# Patient Record
Sex: FEMALE | Race: OTHER | HISPANIC OR LATINO
[De-identification: names, ages, dates, MRNs, and addresses within clinical notes are randomized per-mention and may not be internally consistent; named-entity substitution may affect disease eponyms.]

---

## 2021-11-23 PROBLEM — Z00.00 ENCOUNTER FOR PREVENTIVE HEALTH EXAMINATION: Status: ACTIVE | Noted: 2021-11-23

## 2021-12-02 ENCOUNTER — TRANSCRIPTION ENCOUNTER (OUTPATIENT)
Age: 41
End: 2021-12-02

## 2022-01-19 ENCOUNTER — APPOINTMENT (OUTPATIENT)
Dept: RHEUMATOLOGY | Facility: CLINIC | Age: 42
End: 2022-01-19
Payer: COMMERCIAL

## 2022-01-19 VITALS
WEIGHT: 190 LBS | DIASTOLIC BLOOD PRESSURE: 62 MMHG | BODY MASS INDEX: 38.3 KG/M2 | OXYGEN SATURATION: 99 % | TEMPERATURE: 98 F | HEIGHT: 59 IN | HEART RATE: 67 BPM | SYSTOLIC BLOOD PRESSURE: 118 MMHG

## 2022-01-19 DIAGNOSIS — Z78.9 OTHER SPECIFIED HEALTH STATUS: ICD-10-CM

## 2022-01-19 PROCEDURE — 99205 OFFICE O/P NEW HI 60 MIN: CPT

## 2022-01-19 NOTE — HISTORY OF PRESENT ILLNESS
[FreeTextEntry1] : Patient reports that about three months ago she developed hand pain and significant AM stiffness in the hands. There has been increased fatigue but no rash, skin photosensitivity, fever, hair loss or Raynaud's, but has had pleuritic C./P in the last few months. There is no known FH of autoimmune  CTD.

## 2022-01-19 NOTE — PHYSICAL EXAM
[General Appearance - Alert] : alert [General Appearance - In No Acute Distress] : in no acute distress [General Appearance - Well Nourished] : well nourished [General Appearance - Well Developed] : well developed [Sclera] : the sclera and conjunctiva were normal [Auscultation Breath Sounds / Voice Sounds] : lungs were clear to auscultation bilaterally [Cervical Lymph Nodes Enlarged Posterior Bilaterally] : posterior cervical [Right] : on the right [Size ___ cm] : measuring [unfilled]Ucm [Tender] : tender [Mobile] : mobile [Rubbery] : rubbery [] : no rash [Motor Exam] : the motor exam was normal [Fixed] : non-fixed [FreeTextEntry1] : There is bilateral GH tenderness without palpable swelling.  There is joint line tenderness of the elbows, and mild pain o extreme ROM of the wrists. There is no MCP or PIP tenderness. There is ankle bogginess with joint line tenderness and a hint of warmth in the ankles bilaterally, with mild tenderness of scattered MTP joint on the right only.

## 2022-01-19 NOTE — REASON FOR VISIT
[Initial Evaluation] : an initial evaluation [FreeTextEntry1] : Joint pain and stiffness in the hands

## 2022-01-20 LAB
ALBUMIN SERPL ELPH-MCNC: 4.2 G/DL
ALP BLD-CCNC: 82 U/L
ALT SERPL-CCNC: 40 U/L
ANION GAP SERPL CALC-SCNC: 11 MMOL/L
AST SERPL-CCNC: 23 U/L
BASOPHILS # BLD AUTO: 0.01 K/UL
BASOPHILS NFR BLD AUTO: 0.3 %
BILIRUB SERPL-MCNC: 0.2 MG/DL
BUN SERPL-MCNC: 19 MG/DL
CALCIUM SERPL-MCNC: 8.5 MG/DL
CHLORIDE SERPL-SCNC: 107 MMOL/L
CO2 SERPL-SCNC: 24 MMOL/L
CREAT SERPL-MCNC: 0.65 MG/DL
CRP SERPL-MCNC: 9 MG/L
EOSINOPHIL # BLD AUTO: 0.13 K/UL
EOSINOPHIL NFR BLD AUTO: 3.4 %
ERYTHROCYTE [SEDIMENTATION RATE] IN BLOOD BY WESTERGREN METHOD: 18 MM/HR
GLUCOSE SERPL-MCNC: 86 MG/DL
HCT VFR BLD CALC: 38.5 %
HGB BLD-MCNC: 12.4 G/DL
IMM GRANULOCYTES NFR BLD AUTO: 0.3 %
LYMPHOCYTES # BLD AUTO: 1.56 K/UL
LYMPHOCYTES NFR BLD AUTO: 41.4 %
MAN DIFF?: NORMAL
MCHC RBC-ENTMCNC: 30.5 PG
MCHC RBC-ENTMCNC: 32.2 GM/DL
MCV RBC AUTO: 94.6 FL
MONOCYTES # BLD AUTO: 0.37 K/UL
MONOCYTES NFR BLD AUTO: 9.8 %
NEUTROPHILS # BLD AUTO: 1.69 K/UL
NEUTROPHILS NFR BLD AUTO: 44.8 %
PLATELET # BLD AUTO: 172 K/UL
POTASSIUM SERPL-SCNC: 3.9 MMOL/L
PROT SERPL-MCNC: 6.5 G/DL
RBC # BLD: 4.07 M/UL
RBC # FLD: 12.3 %
RHEUMATOID FACT SER QL: <10 IU/ML
SODIUM SERPL-SCNC: 142 MMOL/L
WBC # FLD AUTO: 3.77 K/UL

## 2022-01-21 LAB
ANA SER IF-ACNC: NEGATIVE
ENA RNP AB SER IA-ACNC: 0.4 AL
ENA SM AB SER IA-ACNC: <0.2 AL
ENA SS-A AB SER IA-ACNC: <0.2 AL
ENA SS-B AB SER IA-ACNC: <0.2 AL

## 2022-01-24 LAB
CCP AB SER IA-ACNC: <8 UNITS
DSDNA AB SER-ACNC: <12 IU/ML
RF+CCP IGG SER-IMP: NEGATIVE

## 2022-01-26 ENCOUNTER — APPOINTMENT (OUTPATIENT)
Dept: RHEUMATOLOGY | Facility: CLINIC | Age: 42
End: 2022-01-26
Payer: COMMERCIAL

## 2022-01-26 VITALS
SYSTOLIC BLOOD PRESSURE: 110 MMHG | OXYGEN SATURATION: 98 % | BODY MASS INDEX: 38.3 KG/M2 | HEIGHT: 59 IN | DIASTOLIC BLOOD PRESSURE: 60 MMHG | WEIGHT: 190 LBS | HEART RATE: 65 BPM

## 2022-01-26 PROCEDURE — 99214 OFFICE O/P EST MOD 30 MIN: CPT

## 2022-01-26 NOTE — PHYSICAL EXAM
[General Appearance - Alert] : alert [General Appearance - In No Acute Distress] : in no acute distress [General Appearance - Well Nourished] : well nourished [General Appearance - Well Developed] : well developed [Sclera] : the sclera and conjunctiva were normal [] : no rash [Motor Exam] : the motor exam was normal [FreeTextEntry1] : There is minimal GH tenderness on the right without palpable swelling. There is no elbow joint line tenderness, and there is minimal pain on extreme ROM of the left wrist. There is minimal joint line tenderness of the right ankle. No other evidence of active synovitis was found.

## 2022-01-26 NOTE — HISTORY OF PRESENT ILLNESS
[FreeTextEntry1] : Patient reports about 30% improvement in general joint pain and stiffness while on prednisone 10 mg daily. She also reports much less numbness and tingling of the fingers.

## 2022-02-02 ENCOUNTER — APPOINTMENT (OUTPATIENT)
Dept: SURGERY | Facility: CLINIC | Age: 42
End: 2022-02-02
Payer: COMMERCIAL

## 2022-02-02 VITALS — HEIGHT: 61 IN | WEIGHT: 190 LBS | BODY MASS INDEX: 35.87 KG/M2

## 2022-02-02 DIAGNOSIS — Z78.9 OTHER SPECIFIED HEALTH STATUS: ICD-10-CM

## 2022-02-02 PROCEDURE — 99203 OFFICE O/P NEW LOW 30 MIN: CPT

## 2022-02-02 RX ORDER — PREDNISONE 5 MG/1
5 TABLET ORAL DAILY
Qty: 60 | Refills: 1 | Status: COMPLETED | COMMUNITY
Start: 2022-01-19 | End: 2022-02-02

## 2022-02-02 RX ORDER — HYDROXYCHLOROQUINE SULFATE 200 MG/1
200 TABLET, FILM COATED ORAL TWICE DAILY
Qty: 60 | Refills: 2 | Status: COMPLETED | COMMUNITY
Start: 2022-01-26 | End: 2022-02-02

## 2022-02-07 NOTE — PLAN
[FreeTextEntry1] : This is a 42-year-old female on prednisone for an inflammatory polyarthropathy being managed by Dr. Brendan Corbett. She has had a  section in the past and complains of two hernias in both of her groins. She states that they are hurting her and have become larger. She is here to be evaluated for repair.\par \par Review of systems: General-denies fatigue.  Cardiac- denies chest pain.  Respiratory- denies shortness of breath.  GI-denies nausea or vomiting.  -denies dysuria.  Musculoskeletal-denies back pain.  Psychiatric-denies hearing voices.\par \par Physical exam: Head-normocephalic.  Sclera are anicteric.  Neck-supple.  Chest-clear.  Abdomen-soft, nontender, nondistended. She appears to have two reducible inguinal hernias which are appreciated in the supine and standing position. Her  scar is very faint in both these hernias appear to be at the lateral border of the scar. I do not suspect that these are incisional hernias but that they are true inguinal hernias. Extremities-no edema.\par \par Plan: The patient either has bilateral inguinal hernia repairs or incisional hernias or a combination of both. I believe these are inguinal hernias and they would best be repaired by an open approach one at a time. The laparoscopic approach is not viable as she has had a previous  section and the bladder flap mobilization during that procedure makes this approach precarious. If these are inguinal hernia repairs then an open approach could potentially have the option of a primary repair without mesh which is often all that is needed in a female. The patient states that the left side is more symptomatic and therefore would plan to repair the left side first. The right side would be planned for several months later. If during surgery it is discovered that this is actually an incisional hernia than the operation will proceed with repair of that hernia utilizing mesh and possibly component separation. If it involves the right side the now to be repaired at the same time. If this is simply an inguinal hernia then only that will be repaired at this time. The patient is agreeable to this plan. He understands the risk and benefits of surgery including but not limited to the possibility of injury to viscera or bladder, the possibility of infection, the possibility of mesh infection, the possibility of mesh migration, the possibility of chronic pain, possibility of bleeding, possibly of recurrence, plus in addition there are the usual medical risk associated with anesthesia including but not limited to cardiac, neurologic, pulmonary, and vascular complications including death.  Patient understands these risks and is agreeable to surgery. She will need to be optimized by her medical the above documentation completed by the scribe is an accurate record of both my words and actions. Prior to surgery. She likely will require stress dose steroid. And we need to ensure that her blood sugars are in control.\par \par Addendum: The patient has called the office several days later and now states the right side is more symptomatic than the left side.  Therefore we will plan to fix the right side first.

## 2022-03-01 ENCOUNTER — RESULT REVIEW (OUTPATIENT)
Age: 42
End: 2022-03-01

## 2022-03-03 ENCOUNTER — RESULT REVIEW (OUTPATIENT)
Age: 42
End: 2022-03-03

## 2022-03-04 ENCOUNTER — APPOINTMENT (OUTPATIENT)
Dept: SURGERY | Facility: HOSPITAL | Age: 42
End: 2022-03-04

## 2022-03-04 ENCOUNTER — RESULT REVIEW (OUTPATIENT)
Age: 42
End: 2022-03-04

## 2022-03-15 ENCOUNTER — APPOINTMENT (OUTPATIENT)
Dept: SURGERY | Facility: CLINIC | Age: 42
End: 2022-03-15
Payer: COMMERCIAL

## 2022-03-15 PROCEDURE — 99024 POSTOP FOLLOW-UP VISIT: CPT

## 2022-03-15 NOTE — ASSESSMENT
[FreeTextEntry1] : Patient presents today for follow-up after open right inguinal herniorrhaphy.  She has no complaints of pain patient states she had minimal discomfort after surgery.  Her appetite is good she is moving her bowels as normal.\par \par Physical examination incisions well-healed there is no evidence of infection hematoma seroma or ecchymosis she has a nice healing ridge.  No recurrence\par \par Status post open right inguinal herniorrhaphy patient doing well surgically no acute findings.  Patient return on a as needed basis regarding this hernia repair.\par \par Incidentally patient mention she had a left inguinal hernia as well.  I have asked that the patient follow-up with Dr. Moser in approximately 3 months time to be evaluated for left inguinal hernia.  Should she develop problems with the hernia she will come sooner.

## 2022-03-23 ENCOUNTER — APPOINTMENT (OUTPATIENT)
Dept: SURGERY | Facility: CLINIC | Age: 42
End: 2022-03-23
Payer: COMMERCIAL

## 2022-03-23 VITALS
TEMPERATURE: 98.5 F | HEIGHT: 61 IN | BODY MASS INDEX: 35.87 KG/M2 | WEIGHT: 190 LBS | SYSTOLIC BLOOD PRESSURE: 103 MMHG | DIASTOLIC BLOOD PRESSURE: 61 MMHG | HEART RATE: 53 BPM | OXYGEN SATURATION: 98 %

## 2022-03-23 PROCEDURE — 99024 POSTOP FOLLOW-UP VISIT: CPT

## 2022-03-23 NOTE — PLAN
[FreeTextEntry1] : The patient is almost 3 weeks status post open repair of right inguinal hernia with a mesh patch.  She has complaints of some soreness in her vulva away from the incision.  She is eating and going to the bathroom.\par \par On exam her incision is well-healed.  There is no sign of infection.  There is expected induration.  There is no recurrence.  She still has a reducible left inguinal hernia.\par \par Plan: The patient will avoid heavy lifting for 3 additional weeks.  She will follow with me in approximately 3 months time to be evaluated for repair of her left inguinal hernia.  I do not wish to do it at the same time as it increases the risk of infection of the right mesh as well as discomfort with both sides being operated at the same time.  After 3 months the initial mesh should be well incorporated and the infection risk should be minimal.

## 2022-04-26 ENCOUNTER — APPOINTMENT (OUTPATIENT)
Dept: RHEUMATOLOGY | Facility: CLINIC | Age: 42
End: 2022-04-26
Payer: COMMERCIAL

## 2022-04-26 DIAGNOSIS — M06.4 INFLAMMATORY POLYARTHROPATHY: ICD-10-CM

## 2022-04-26 PROCEDURE — 99213 OFFICE O/P EST LOW 20 MIN: CPT

## 2022-04-26 RX ORDER — HYDROXYCHLOROQUINE SULFATE 200 MG/1
200 TABLET, FILM COATED ORAL
Qty: 60 | Refills: 5 | Status: ACTIVE | COMMUNITY
Start: 2022-04-26

## 2022-04-26 NOTE — HISTORY OF PRESENT ILLNESS
[FreeTextEntry1] : Patient was taking prednisone and Plaquenil as Rx'ed until someone told her that both will cause her to gain weight, and she stopped about one month ago (she did not call here). She reports after stopping both, joint symptoms recurred less so than at initial presentation, and is still having joint symptoms.

## 2022-06-14 ENCOUNTER — APPOINTMENT (OUTPATIENT)
Dept: SURGERY | Facility: CLINIC | Age: 42
End: 2022-06-14

## 2022-06-15 ENCOUNTER — APPOINTMENT (OUTPATIENT)
Dept: SURGERY | Facility: CLINIC | Age: 42
End: 2022-06-15
Payer: COMMERCIAL

## 2022-06-15 VITALS
SYSTOLIC BLOOD PRESSURE: 115 MMHG | BODY MASS INDEX: 34.05 KG/M2 | OXYGEN SATURATION: 99 % | HEART RATE: 60 BPM | DIASTOLIC BLOOD PRESSURE: 71 MMHG | WEIGHT: 180.2 LBS | TEMPERATURE: 98.5 F

## 2022-06-15 DIAGNOSIS — Z87.19 PERSONAL HISTORY OF OTHER DISEASES OF THE DIGESTIVE SYSTEM: ICD-10-CM

## 2022-06-15 PROCEDURE — 99213 OFFICE O/P EST LOW 20 MIN: CPT

## 2022-06-15 NOTE — PLAN
[FreeTextEntry1] : The patient is 3 months status post open right inguinal hernia repair with mesh.  Her surgery was done open because she had history of  section.  She was also known to have a left inguinal hernia at the time and decision was made to postpone that surgery since that was the least symptomatic of the 2.  She is here to be evaluated for repair of her left hernia.  She complains of occasional pain in the groin and feeling a lump.  She has no nausea or vomiting.\par \par She takes hydroxychloroquine for inflammatory arthritis\par \par Review of systems: General-denies fatigue.  Cardiac- denies chest pain.  Respiratory- denies shortness of breath.  GI-denies nausea or vomiting.  -denies dysuria.  Musculoskeletal-denies back pain.  Psychiatric-denies hearing voices.\par \par Physical exam: Head-normocephalic.  Sclera are anicteric.  Neck-supple.  Chest-clear.  Abdomen-soft, nontender, nondistended.  Extremities-no edema.  Her right inguinal hernia incision is well-healed.  There is no recurrence.  She has a small reducible left inguinal hernia that is best palpated on standing position.\par \par Plan: We will proceed with open repair of left inguinal hernia possibly with mesh.  The risk and benefits of surgery have been explained to the patient in detail.  These are including but not limited to the possibility of infection, bleeding, injury to underlying viscera, chronic pain, mesh infection, mesh migration, the need for future surgery to remove mesh, alteration to the cosmetic appearance of the abdominal wall, and scar formation.\par \par In addition there are the usual medical risk associated with anesthesia including but not limited to cardiac, neurologic, pulmonary, and vascular complications including death.  Patient understands these risks and is agreeable to surgery.  I will obtain the written consent at [facility] as there are no [place] forms available in the office.  She will get 2 g of Ancef prophylaxis chlorhexadine prep.

## 2022-07-22 ENCOUNTER — RESULT REVIEW (OUTPATIENT)
Age: 42
End: 2022-07-22

## 2022-07-24 ENCOUNTER — RESULT REVIEW (OUTPATIENT)
Age: 42
End: 2022-07-24

## 2022-07-25 ENCOUNTER — APPOINTMENT (OUTPATIENT)
Dept: SURGERY | Facility: HOSPITAL | Age: 42
End: 2022-07-25

## 2022-07-25 ENCOUNTER — RESULT REVIEW (OUTPATIENT)
Age: 42
End: 2022-07-25

## 2022-07-25 ENCOUNTER — TRANSCRIPTION ENCOUNTER (OUTPATIENT)
Age: 42
End: 2022-07-25

## 2022-08-08 ENCOUNTER — APPOINTMENT (OUTPATIENT)
Dept: SURGERY | Facility: CLINIC | Age: 42
End: 2022-08-08

## 2022-08-08 ENCOUNTER — NON-APPOINTMENT (OUTPATIENT)
Age: 42
End: 2022-08-08

## 2022-08-08 VITALS
BODY MASS INDEX: 34.38 KG/M2 | HEIGHT: 60.24 IN | SYSTOLIC BLOOD PRESSURE: 107 MMHG | DIASTOLIC BLOOD PRESSURE: 62 MMHG | WEIGHT: 177.4 LBS | HEART RATE: 59 BPM | RESPIRATION RATE: 18 BRPM | OXYGEN SATURATION: 98 %

## 2022-08-08 PROCEDURE — 99024 POSTOP FOLLOW-UP VISIT: CPT

## 2022-08-08 NOTE — PLAN
[FreeTextEntry1] : The patient is 2-week status post open repair of left inguinal hernia with mesh.  She feels well.  She is complaining about her old right inguinal hernia repair stating that she has pain there.  Both sides are examined.  There is no infection.  There is no recurrence.  Both sides were repaired with mesh.\par \par Plan: Postoperatively she is doing well.  As far as her original hernia repair I suspect this is a muscle pull.  I have advised her to take Motrin.  To apply ice.  And to rested.  If it does not feel better in 6 weeks time she will follow back with me.  She can return to work as long she does not do any heavy lifting.

## 2022-10-31 ENCOUNTER — APPOINTMENT (OUTPATIENT)
Dept: RHEUMATOLOGY | Facility: CLINIC | Age: 42
End: 2022-10-31

## 2022-11-02 ENCOUNTER — APPOINTMENT (OUTPATIENT)
Dept: SURGERY | Facility: CLINIC | Age: 42
End: 2022-11-02

## 2022-11-02 VITALS
SYSTOLIC BLOOD PRESSURE: 97 MMHG | DIASTOLIC BLOOD PRESSURE: 55 MMHG | HEART RATE: 57 BPM | OXYGEN SATURATION: 99 % | TEMPERATURE: 98.3 F | WEIGHT: 182.2 LBS | BODY MASS INDEX: 35.3 KG/M2

## 2022-11-02 DIAGNOSIS — Z87.19 PERSONAL HISTORY OF OTHER DISEASES OF THE DIGESTIVE SYSTEM: ICD-10-CM

## 2022-11-02 PROCEDURE — 99213 OFFICE O/P EST LOW 20 MIN: CPT

## 2022-11-02 NOTE — PLAN
[FreeTextEntry1] : Patient has a history of rheumatoid disease and underwent a right open inguinal hernia repair with mesh in early  and then had a left inguinal hernia repair with mesh in 2022.  At her last office visit she was complaining of pain on the right hernia repair site but not the left side.  She returns today saying that the pain on the right is worse and now she has pain on the left as well.  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  There is no hernias appreciated.  She has a significant pannus which pulls on both scars.  She had a previous  section in this area as well.  I am unable to appreciate a recurrence of hernia on either side.  There is no evidence of infection.\par \par Plan: The patient has persistent pain at both of her hernia sites.  Clinically there is no recurrence or infection.  Most likely this is either a groin pull or chronic inflammation from her pannus and gravity versus less likely her rheumatoid disease.  I will get a CAT scan of her pelvis with IV contrast to better evaluate the area.  If there is no surgical pathology seen I will refer her to pain management

## 2022-11-25 ENCOUNTER — RESULT REVIEW (OUTPATIENT)
Age: 42
End: 2022-11-25

## 2022-12-08 ENCOUNTER — NON-APPOINTMENT (OUTPATIENT)
Age: 42
End: 2022-12-08

## 2022-12-28 ENCOUNTER — APPOINTMENT (OUTPATIENT)
Dept: PAIN MANAGEMENT | Facility: CLINIC | Age: 42
End: 2022-12-28

## 2022-12-28 VITALS
SYSTOLIC BLOOD PRESSURE: 92 MMHG | HEIGHT: 60.24 IN | WEIGHT: 182 LBS | BODY MASS INDEX: 35.26 KG/M2 | DIASTOLIC BLOOD PRESSURE: 53 MMHG | TEMPERATURE: 98 F

## 2022-12-28 PROCEDURE — 99204 OFFICE O/P NEW MOD 45 MIN: CPT

## 2022-12-28 NOTE — REASON FOR VISIT
[Initial Consultation] : an initial pain management consultation [FreeTextEntry2] : Referred by Dr. Moser (surgeon)

## 2022-12-28 NOTE — ASSESSMENT
[FreeTextEntry1] : 42 yof w/ severe bilateral groin pain after bilateral inguinal hernia surgery. Quality of life is impaired. There has been a severe exacerbation of the patient's chronic pain.\par \par I have personally reviewed the patient's CT in detail and discussed it with them which is significant for no recurrent hernia.\par \par The patient has failed to have relief with medication management. The patient has failed to have relief with more then six weeks of physical therapy within the last three months. Given the patients failure to improve with all other conservative measures, recommend bilateral ilioinguinal nerve block under ultrasound guidance. The patient will follow-up with me in my office two weeks following intervention.\par \par Physical therapy prescribed - goal will be to increase ROM, strengthening, postural training, other modalities ad fina which may include massage and stim. Goals of therapy discussed with the patient in detail and will be discussed with physical therapist. Patient will follow-up following course of physical therapy to monitor progress and adjust therapy as needed.\par \par Acetaminophen 1,000 mg q8h prn for moderate pain. Risks, benefits, and alternatives of acetaminophen discussed with patient.\par \par Ibuprofen 600 mg q8h prn add when pain is not adequately controlled with acetaminophen. Risks, benefits, and alternatives of ibuprofen discussed with patient.\par \par Diet and nutritional strategies discussed which may improve patients pain and will improve overall health.\par \par

## 2022-12-28 NOTE — PHYSICAL EXAM

## 2022-12-28 NOTE — DATA REVIEWED
[FreeTextEntry1] : \par  CT Report             Final\par \par No Documents Attached\par \par \par \par \par   [facility]\par                                          [address]\par                                    [place], [place]  [address]\par                                        Department of Radiology\par                                             [phone]\par \par \par Patient Name:      CB ZHENG                Location:       [facility]\par Med Rec #:        [id number]                    Account #:      [id number]\par YOB: 1980                    Ordering:       Eugenio Moser DO\par Age: 42               Sex:    F                 Attending:      Eugenio Moser DO\par PCP:        Eugenio Moser DO\par ______________________________________________________________________________________\par \par Exam Date:      11/25/22\par Exam:         CT PELVIS IC\par Order#:       [id number]\par \par \par \par CLINICAL INFORMATION: Bilateral groin pain status post bilateral hernia repair.\par \par  COMPARISON: CT of the abdomen and pelvis dated 07/03/2018\par \par  CONTRAST/COMPLICATIONS:\par  IV Contrast: Omnipaque 350  90 cc administered   10 cc discarded\par  Oral Contrast: NONE\par  Complications: None reported at time of study completion\par \par  PROCEDURE:\par  CT of the Pelvis was performed.\par  Sagittal and coronal reformats were performed.\par \par  FINDINGS:\par  BLADDER: Within normal limits.\par  REPRODUCTIVE ORGANS: Within normal limits. Engorged left gonadal vein and periuterine vasculature.\par Findings can be seen in normal asymptomatic individuals but also in the setting of pelvic congestion\par syndrome.\par  LYMPH NODES: No pelvic lymphadenopathy.\par \par  VISUALIZED PORTIONS:\par  ABDOMINAL ORGANS: Within normal limits.\par  BOWEL: Within normal limits.\par  PERITONEUM: Small amount of free pelvic fluid.\par  VESSELS: Engorged left gonadal vein and periuterine vasculature. Correlate for clinical evidence of\par pelvic congestion syndrome. Findings are however nonspecific and can be seen in normal asymptomatic\par individuals.\par  ABDOMINAL WALL: Postsurgical changes related to previous bilateral inguinal hernia repair. No\par recurrent hernia or discrete collection. Several stable small caliber varices in the mons pubis.\par  BONES: Within normal limits.\par \par \par  IMPRESSION:\par  Postsurgical changes without recurrent hernia or discrete collection.\par \par \par

## 2022-12-28 NOTE — HISTORY OF PRESENT ILLNESS
[___ mths] : [unfilled] month(s) ago [Constant] : constant [5] : a minimum pain level of 5/10 [6] : a maximum pain level of 6/10 [Sharp] : sharp [Aching] : aching [Laying] : laying [Sitting] : sitting [Walking] : walking [Rest] : rest [FreeTextEntry1] : HPI: Ms. CB ZHENG is a 42 year F with inflammatory arthritis, bilateral inguinal hernia repair (right April '22, left July '22). Reports persistent pain that runs from groin anteriorly to knee since surgery. Quality of life is impaired. There has been a severe exacerbation of the patient's chronic pain.\par \par \par   [FreeTextEntry2] : 18 [FreeTextEntry7] : Groin pain

## 2022-12-28 NOTE — CONSULT LETTER
[Dear  ___] : Dear  [unfilled], [Consult Letter:] : I had the pleasure of evaluating your patient, [unfilled]. [Please see my note below.] : Please see my note below. [Consult Closing:] : Thank you very much for allowing me to participate in the care of this patient.  If you have any questions, please do not hesitate to contact me. [Sincerely,] : Sincerely, [FreeTextEntry3] : Romain Jade MD\par

## 2023-01-12 ENCOUNTER — TRANSCRIPTION ENCOUNTER (OUTPATIENT)
Age: 43
End: 2023-01-12

## 2023-01-12 ENCOUNTER — APPOINTMENT (OUTPATIENT)
Dept: PAIN MANAGEMENT | Facility: HOSPITAL | Age: 43
End: 2023-01-12

## 2023-02-09 ENCOUNTER — APPOINTMENT (OUTPATIENT)
Dept: PAIN MANAGEMENT | Facility: CLINIC | Age: 43
End: 2023-02-09
Payer: COMMERCIAL

## 2023-02-09 VITALS
HEIGHT: 60 IN | BODY MASS INDEX: 35.73 KG/M2 | WEIGHT: 182 LBS | TEMPERATURE: 98 F | DIASTOLIC BLOOD PRESSURE: 72 MMHG | SYSTOLIC BLOOD PRESSURE: 107 MMHG

## 2023-02-09 DIAGNOSIS — R10.31 RIGHT LOWER QUADRANT PAIN: ICD-10-CM

## 2023-02-09 DIAGNOSIS — R10.32 RIGHT LOWER QUADRANT PAIN: ICD-10-CM

## 2023-02-09 DIAGNOSIS — G57.93 UNSPECIFIED MONONEUROPATHY OF BILATERAL LOWER LIMBS: ICD-10-CM

## 2023-02-09 PROCEDURE — 99214 OFFICE O/P EST MOD 30 MIN: CPT

## 2023-02-09 NOTE — HISTORY OF PRESENT ILLNESS
[___ mths] : [unfilled] month(s) ago [Constant] : constant [5] : a minimum pain level of 5/10 [6] : a maximum pain level of 6/10 [Sharp] : sharp [Aching] : aching [Laying] : laying [Sitting] : sitting [Walking] : walking [Rest] : rest [FreeTextEntry1] : Interval History:\par She returns s/p ilioinguinal nerve block which gave her tremendous relief on her left side. She still has some pain on the right. ALso with low back pain. Quality of life is impaired. There has been a severe exacerbation of the patient's chronic pain. Wishes for further intervention. \par \par \par HPI: Ms. CB ZHENG is a 42 year F with inflammatory arthritis, bilateral inguinal hernia repair (right April '22, left July '22). Reports persistent pain that runs from groin anteriorly to knee since surgery. Quality of life is impaired. There has been a severe exacerbation of the patient's chronic pain.\par \par \par   [FreeTextEntry2] : 18 [FreeTextEntry7] : Groin pain

## 2023-02-09 NOTE — DATA REVIEWED
[FreeTextEntry1] : 11/25/22\par Exam:         CT PELVIS IC\par \par  CT of the Pelvis was performed.\par  Sagittal and coronal reformats were performed.\par \par  FINDINGS:\par  BLADDER: Within normal limits.\par  REPRODUCTIVE ORGANS: Within normal limits. Engorged left gonadal vein and periuterine vasculature.\par Findings can be seen in normal asymptomatic individuals but also in the setting of pelvic congestion\par syndrome.\par  LYMPH NODES: No pelvic lymphadenopathy.\par \par  VISUALIZED PORTIONS:\par  ABDOMINAL ORGANS: Within normal limits.\par  BOWEL: Within normal limits.\par  PERITONEUM: Small amount of free pelvic fluid.\par  VESSELS: Engorged left gonadal vein and periuterine vasculature. Correlate for clinical evidence of\par pelvic congestion syndrome. Findings are however nonspecific and can be seen in normal asymptomatic\par individuals.\par  ABDOMINAL WALL: Postsurgical changes related to previous bilateral inguinal hernia repair. No\par recurrent hernia or discrete collection. Several stable small caliber varices in the mons pubis.\par  BONES: Within normal limits.\par \par \par  IMPRESSION:\par  Postsurgical changes without recurrent hernia or discrete collection.\par \par \par

## 2023-02-09 NOTE — PHYSICAL EXAM

## 2023-02-09 NOTE — ASSESSMENT
[FreeTextEntry1] : 43 yof w/ severe bilateral groin pain after bilateral inguinal hernia surgery. Quality of life is impaired. There has been a severe exacerbation of the patient's chronic pain. She had significant relief from left ilioinguinal nerve block.\par \par I have personally reviewed the patient's CT in detail and discussed it with them which is significant for no recurrent hernia.\par \par The patient has failed to have relief with medication management. The patient has failed to have relief with more then six weeks of physical therapy within the last three months. Given the patients failure to improve with all other conservative measures, recommend right ilioinguinal nerve block under ultrasound guidance. The patient will follow-up with me in my office two weeks following intervention.\par \par Consider MRI lumbar spine. \par \par Physical therapy prescribed - goal will be to increase ROM, strengthening, postural training, other modalities ad fina which may include massage and stim. Goals of therapy discussed with the patient in detail and will be discussed with physical therapist. Patient will follow-up following course of physical therapy to monitor progress and adjust therapy as needed.\par \par Acetaminophen 1,000 mg q8h prn for moderate pain. Risks, benefits, and alternatives of acetaminophen discussed with patient.\par \par Ibuprofen 600 mg q8h prn add when pain is not adequately controlled with acetaminophen. Risks, benefits, and alternatives of ibuprofen discussed with patient.\par \par Diet and nutritional strategies discussed which may improve patients pain and will improve overall health.\par \par

## 2023-02-09 NOTE — REASON FOR VISIT
[Follow-Up Visit] : a follow-up pain management visit [FreeTextEntry2] : Referred by Dr. Moser (surgeon)

## 2023-02-21 ENCOUNTER — TRANSCRIPTION ENCOUNTER (OUTPATIENT)
Age: 43
End: 2023-02-21

## 2023-02-21 ENCOUNTER — APPOINTMENT (OUTPATIENT)
Dept: PAIN MANAGEMENT | Facility: HOSPITAL | Age: 43
End: 2023-02-21

## 2023-03-06 ENCOUNTER — APPOINTMENT (OUTPATIENT)
Dept: PAIN MANAGEMENT | Facility: CLINIC | Age: 43
End: 2023-03-06
Payer: COMMERCIAL

## 2023-03-06 VITALS
DIASTOLIC BLOOD PRESSURE: 56 MMHG | BODY MASS INDEX: 35.73 KG/M2 | SYSTOLIC BLOOD PRESSURE: 90 MMHG | WEIGHT: 182 LBS | HEIGHT: 60 IN | TEMPERATURE: 98 F

## 2023-03-06 PROCEDURE — 99214 OFFICE O/P EST MOD 30 MIN: CPT

## 2023-03-06 NOTE — PHYSICAL EXAM

## 2023-03-06 NOTE — HISTORY OF PRESENT ILLNESS
[___ mths] : [unfilled] month(s) ago [Constant] : constant [5] : a minimum pain level of 5/10 [6] : a maximum pain level of 6/10 [Sharp] : sharp [Aching] : aching [Laying] : laying [Sitting] : sitting [Walking] : walking [Rest] : rest [FreeTextEntry1] : Interval History:\par She returns s/p ilioinguinal nerve block which gave her tremendous relief on her left side but still with some abdominal pain. Today her greatest complaint is her back pain. Pain radiates down the left leg. Quality of life is impaired. There has been a severe exacerbation of the patient's chronic pain. \par \par HPI: Ms. CB ZHENG is a 42 year F with inflammatory arthritis, bilateral inguinal hernia repair (right April '22, left July '22). Reports persistent pain that runs from groin anteriorly to knee since surgery. Quality of life is impaired. There has been a severe exacerbation of the patient's chronic pain.\par \par \par   [FreeTextEntry2] : 18 [FreeTextEntry7] : Groin pain

## 2023-03-06 NOTE — ASSESSMENT
[FreeTextEntry1] : 43 yof w/ severe bilateral groin pain after bilateral inguinal hernia surgery which has improved after injections. Worst pain today is in her low back radiating down her legs.\par \par I have personally reviewed the patient's CT in detail and discussed it with them which is significant for no recurrent hernia.\par \par The patient has failed to have relief with over six weeks of physical therapy within the last three months and all medications. GIven their failure to improve with all other conservative measures recommend MRI lumbar spine. Patient will return to review imaging and plan for potential intervention.\par \par Physical therapy prescribed - goal will be to increase ROM, strengthening, postural training, other modalities ad fina which may include massage and stim. Goals of therapy discussed with the patient in detail and will be discussed with physical therapist. Patient will follow-up following course of physical therapy to monitor progress and adjust therapy as needed.\par \par Acetaminophen 1,000 mg q8h prn for moderate pain. Risks, benefits, and alternatives of acetaminophen discussed with patient.\par \par Ibuprofen 600 mg q8h prn add when pain is not adequately controlled with acetaminophen. Risks, benefits, and alternatives of ibuprofen discussed with patient.\par \par Diet and nutritional strategies discussed which may improve patients pain and will improve overall health.\par \par

## 2023-03-17 ENCOUNTER — RESULT REVIEW (OUTPATIENT)
Age: 43
End: 2023-03-17

## 2023-03-30 ENCOUNTER — APPOINTMENT (OUTPATIENT)
Dept: PAIN MANAGEMENT | Facility: CLINIC | Age: 43
End: 2023-03-30
Payer: COMMERCIAL

## 2023-03-30 VITALS
SYSTOLIC BLOOD PRESSURE: 94 MMHG | TEMPERATURE: 98 F | WEIGHT: 182 LBS | BODY MASS INDEX: 35.73 KG/M2 | HEIGHT: 60 IN | DIASTOLIC BLOOD PRESSURE: 63 MMHG

## 2023-03-30 DIAGNOSIS — M79.10 MYALGIA, UNSPECIFIED SITE: ICD-10-CM

## 2023-03-30 DIAGNOSIS — R10.9 UNSPECIFIED ABDOMINAL PAIN: ICD-10-CM

## 2023-03-30 DIAGNOSIS — M54.16 RADICULOPATHY, LUMBAR REGION: ICD-10-CM

## 2023-03-30 DIAGNOSIS — M47.817 SPONDYLOSIS W/OUT MYELOPATHY OR RADICULOPATHY, LUMBOSACRAL REGION: ICD-10-CM

## 2023-03-30 PROCEDURE — 99214 OFFICE O/P EST MOD 30 MIN: CPT

## 2023-03-31 PROBLEM — M54.16 LUMBAR RADICULOPATHY, CHRONIC: Status: ACTIVE | Noted: 2023-03-06

## 2023-03-31 PROBLEM — M47.817 LUMBOSACRAL SPONDYLOSIS WITHOUT MYELOPATHY: Status: ACTIVE | Noted: 2023-02-09

## 2023-03-31 PROBLEM — M79.10 MYALGIA: Status: ACTIVE | Noted: 2023-02-09

## 2023-03-31 PROBLEM — R10.9 ABDOMINAL DISCOMFORT: Status: ACTIVE | Noted: 2022-11-02

## 2023-03-31 RX ORDER — OLOPATADINE HCL 1 MG/ML
0.1 SOLUTION/ DROPS OPHTHALMIC
Qty: 5 | Refills: 0 | Status: ACTIVE | COMMUNITY
Start: 2023-03-10

## 2023-03-31 RX ORDER — PREDNISONE 20 MG/1
20 TABLET ORAL
Qty: 10 | Refills: 0 | Status: ACTIVE | COMMUNITY
Start: 2023-03-21

## 2023-03-31 RX ORDER — SEMAGLUTIDE 1.34 MG/ML
2 INJECTION, SOLUTION SUBCUTANEOUS
Qty: 2 | Refills: 0 | Status: ACTIVE | COMMUNITY
Start: 2023-03-10

## 2023-03-31 RX ORDER — CEPHALEXIN 500 MG/1
500 CAPSULE ORAL
Qty: 21 | Refills: 0 | Status: ACTIVE | COMMUNITY
Start: 2023-03-17

## 2023-03-31 RX ORDER — AZELASTINE HYDROCHLORIDE 0.5 MG/ML
0.05 SOLUTION/ DROPS OPHTHALMIC
Qty: 6 | Refills: 0 | Status: ACTIVE | COMMUNITY
Start: 2023-03-21

## 2023-03-31 RX ORDER — KETOROLAC TROMETHAMINE 10 MG/1
10 TABLET, FILM COATED ORAL
Qty: 10 | Refills: 0 | Status: ACTIVE | COMMUNITY
Start: 2023-03-21

## 2023-03-31 RX ORDER — AMOXICILLIN AND CLAVULANATE POTASSIUM 875; 125 MG/1; MG/1
875-125 TABLET, COATED ORAL
Qty: 16 | Refills: 0 | Status: ACTIVE | COMMUNITY
Start: 2023-03-21

## 2023-03-31 RX ORDER — IPRATROPIUM BROMIDE 21 UG/1
0.03 SPRAY NASAL
Qty: 30 | Refills: 0 | Status: ACTIVE | COMMUNITY
Start: 2023-03-27

## 2023-03-31 RX ORDER — ECONAZOLE NITRATE 10 MG/G
1 CREAM TOPICAL
Qty: 30 | Refills: 0 | Status: ACTIVE | COMMUNITY
Start: 2023-03-17

## 2023-03-31 NOTE — PHYSICAL EXAM

## 2023-03-31 NOTE — PHYSICAL EXAM

## 2023-03-31 NOTE — HISTORY OF PRESENT ILLNESS
[___ mths] : [unfilled] month(s) ago [Constant] : constant [5] : a minimum pain level of 5/10 [6] : a maximum pain level of 6/10 [Sharp] : sharp [Aching] : aching [Laying] : laying [Sitting] : sitting [Walking] : walking [Rest] : rest [FreeTextEntry1] : Interval History:\par Today her greatest complaint is her back pain. Pain radiates down the left leg. Quality of life is impaired. There has been a severe exacerbation of the patient's chronic pain. She is here to review her MRI. No relief w/ medications. Abdominal pain has improved.\par \par HPI: Ms. CB ZHENG is a 42 year F with inflammatory arthritis, bilateral inguinal hernia repair (right April '22, left July '22). Reports persistent pain that runs from groin anteriorly to knee since surgery. Quality of life is impaired. There has been a severe exacerbation of the patient's chronic pain.\par \par \par   [FreeTextEntry2] : 18 [FreeTextEntry7] : Groin pain

## 2023-03-31 NOTE — ASSESSMENT
[FreeTextEntry1] : 43 yof w/ severe bilateral groin pain after bilateral inguinal hernia surgery which has improved after injections. Worst pain today is in her low back radiating down her legs.\par \par I have personally reviewed the patient's CT in detail and discussed it with them which is significant for no recurrent hernia.\par \par I have personally reviewed the patient's MRI in detail and discussed it with them which is significant for disc bulging at L4-L5.\par \par If no relief w/ conservative care plan for FLAQUITA. \par \par Physical therapy prescribed - goal will be to increase ROM, strengthening, postural training, other modalities ad fina which may include massage and stim. Goals of therapy discussed with the patient in detail and will be discussed with physical therapist. Patient will follow-up following course of physical therapy to monitor progress and adjust therapy as needed.\par \par Acetaminophen 1,000 mg q8h prn for moderate pain. Risks, benefits, and alternatives of acetaminophen discussed with patient.\par \par Ibuprofen 600 mg q8h prn add when pain is not adequately controlled with acetaminophen. Risks, benefits, and alternatives of ibuprofen discussed with patient.\par \par Diet and nutritional strategies discussed which may improve patients pain and will improve overall health.\par \par

## 2023-03-31 NOTE — DATA REVIEWED
[FreeTextEntry1] : \par  MRI Report             Final\par \par No Documents Attached\par \par \par \par \par   [facility]\par                                          [address]\par                                    [place], [place]  [address]\par                                        Department of Radiology\par                                             [phone]\par \par \par Patient Name:      CB ZHENG                Location:       [facility]\par Med Rec #:        [id number]                    Account #:      [id number]\par YOB: 1980                    Ordering:       Romain Jade MD\par Age: 43               Sex:    F                 Attending:      Romain Jade MD\par PCP:        Margaret Robb\par ______________________________________________________________________________________\par \par Exam Date:      03/17/23\par Exam:         MRI LUMBAR SPINE\par Order#:       MRI [id number]\par \par \par \par CLINICAL INFORMATION: Lumbar radiculopathy\par \par \par  TECHNIQUE: Multiplanar, multisequence MRI was performed of the lumbar spine.\par  IV Contrast: NONE\par \par  PRIOR STUDIES: No priors available for comparison.\par \par  FINDINGS:\par \par  LOCALIZER: No additional findings.\par  BONES: There is no fracture or bone marrow edema.\par  ALIGNMENT: The alignment is normal.\par  SACROILIAC JOINTS/SACRUM: There is no sacral fracture. The SI joints are partially visualized but\par are intact.\par  CONUS AND CAUDA EQUINA: The distal cord and conus are normal in signal. Conus terminates at L1.\par  VISUALIZED INTRAPELVIC/INTRA-ABDOMINAL SOFT TISSUES: Normal.\par  PARASPINAL SOFT TISSUES: Normal.\par \par \par  INDIVIDUAL LEVELS:\par \par  LOWER THORACIC SPINE: No spinal canal or neuroforaminal stenosis.\par \par  L1-L2: No disc bulge or herniation. No spinal canal or neuroforaminal stenosis.\par  L2-L3: No disc bulge or herniation. No spinal canal or neuroforaminal stenosis.\par  L3-L4: Mild disc bulge with posterior annular fissure slightly indents sac. No disc herniation. No\par spinal canal or neuroforaminal stenosis.\par  L4-L5: Minimal disc bulge. No disc herniation. No spinal canal or neuroforaminal stenosis. Mild\par bilateral facet arthritis.\par  L5-S1: Mild disc bulge. No disc herniation. No spinal canal or neuroforaminal stenosis.\par \par \par \par  IMPRESSION:\par \par  No evidence of lumbar spinal stenosis, disc herniation or foraminal nerve root compression.\par \par  L3-L4 mild disc bulge with superimposed posterior annular fissure mildly indents sac.\par \par  Mild L5-S1 and minimal L4-L5 disc bulging. Mild L4-5 facet arthritis.\par \par  --- End of Report ---\par \par ***Electronically Signed ***\par -----------------------------------------------\par Eric Gee MD              03/19/23 1247\par \par Dictated on 03/19/23\par \par \par     \par \par 11/25/22\par Exam:         CT PELVIS IC\par \par  CT of the Pelvis was performed.\par  Sagittal and coronal reformats were performed.\par \par  FINDINGS:\par  BLADDER: Within normal limits.\par  REPRODUCTIVE ORGANS: Within normal limits. Engorged left gonadal vein and periuterine vasculature.\par Findings can be seen in normal asymptomatic individuals but also in the setting of pelvic congestion\par syndrome.\par  LYMPH NODES: No pelvic lymphadenopathy.\par \par  VISUALIZED PORTIONS:\par  ABDOMINAL ORGANS: Within normal limits.\par  BOWEL: Within normal limits.\par  PERITONEUM: Small amount of free pelvic fluid.\par  VESSELS: Engorged left gonadal vein and periuterine vasculature. Correlate for clinical evidence of\par pelvic congestion syndrome. Findings are however nonspecific and can be seen in normal asymptomatic\par individuals.\par  ABDOMINAL WALL: Postsurgical changes related to previous bilateral inguinal hernia repair. No\par recurrent hernia or discrete collection. Several stable small caliber varices in the mons pubis.\par  BONES: Within normal limits.\par \par \par  IMPRESSION:\par  Postsurgical changes without recurrent hernia or discrete collection.\par \par \par

## 2023-03-31 NOTE — DATA REVIEWED
[FreeTextEntry1] : \par  MRI Report             Final\par \par No Documents Attached\par \par \par \par \par   [facility]\par                                          [address]\par                                    [place], [place]  [address]\par                                        Department of Radiology\par                                             [phone]\par \par \par Patient Name:      CB ZHENG                Location:       [facility]\par Med Rec #:        [id number]                    Account #:      [id number]\par YOB: 1980                    Ordering:       Romain Jade MD\par Age: 43               Sex:    F                 Attending:      Romain Jade MD\par PCP:        Margaret Robb\par ______________________________________________________________________________________\par \par Exam Date:      03/17/23\par Exam:         MRI LUMBAR SPINE\par Order#:       MRI [id number]\par \par \par \par CLINICAL INFORMATION: Lumbar radiculopathy\par \par \par  TECHNIQUE: Multiplanar, multisequence MRI was performed of the lumbar spine.\par  IV Contrast: NONE\par \par  PRIOR STUDIES: No priors available for comparison.\par \par  FINDINGS:\par \par  LOCALIZER: No additional findings.\par  BONES: There is no fracture or bone marrow edema.\par  ALIGNMENT: The alignment is normal.\par  SACROILIAC JOINTS/SACRUM: There is no sacral fracture. The SI joints are partially visualized but\par are intact.\par  CONUS AND CAUDA EQUINA: The distal cord and conus are normal in signal. Conus terminates at L1.\par  VISUALIZED INTRAPELVIC/INTRA-ABDOMINAL SOFT TISSUES: Normal.\par  PARASPINAL SOFT TISSUES: Normal.\par \par \par  INDIVIDUAL LEVELS:\par \par  LOWER THORACIC SPINE: No spinal canal or neuroforaminal stenosis.\par \par  L1-L2: No disc bulge or herniation. No spinal canal or neuroforaminal stenosis.\par  L2-L3: No disc bulge or herniation. No spinal canal or neuroforaminal stenosis.\par  L3-L4: Mild disc bulge with posterior annular fissure slightly indents sac. No disc herniation. No\par spinal canal or neuroforaminal stenosis.\par  L4-L5: Minimal disc bulge. No disc herniation. No spinal canal or neuroforaminal stenosis. Mild\par bilateral facet arthritis.\par  L5-S1: Mild disc bulge. No disc herniation. No spinal canal or neuroforaminal stenosis.\par \par \par \par  IMPRESSION:\par \par  No evidence of lumbar spinal stenosis, disc herniation or foraminal nerve root compression.\par \par  L3-L4 mild disc bulge with superimposed posterior annular fissure mildly indents sac.\par \par  Mild L5-S1 and minimal L4-L5 disc bulging. Mild L4-5 facet arthritis.\par \par  --- End of Report ---\par \par ***Electronically Signed ***\par -----------------------------------------------\par Eric Gee MD              03/19/23 1247\par \par Dictated on 03/19/23\par \par \par     \par \par 11/25/22\par Exam:         CT PELVIS IC\par \par  CT of the Pelvis was performed.\par  Sagittal and coronal reformats were performed.\par \par  FINDINGS:\par  BLADDER: Within normal limits.\par  REPRODUCTIVE ORGANS: Within normal limits. Engorged left gonadal vein and periuterine vasculature.\par Findings can be seen in normal asymptomatic individuals but also in the setting of pelvic congestion\par syndrome.\par  LYMPH NODES: No pelvic lymphadenopathy.\par \par  VISUALIZED PORTIONS:\par  ABDOMINAL ORGANS: Within normal limits.\par  BOWEL: Within normal limits.\par  PERITONEUM: Small amount of free pelvic fluid.\par  VESSELS: Engorged left gonadal vein and periuterine vasculature. Correlate for clinical evidence of\par pelvic congestion syndrome. Findings are however nonspecific and can be seen in normal asymptomatic\par individuals.\par  ABDOMINAL WALL: Postsurgical changes related to previous bilateral inguinal hernia repair. No\par recurrent hernia or discrete collection. Several stable small caliber varices in the mons pubis.\par  BONES: Within normal limits.\par \par \par  IMPRESSION:\par  Postsurgical changes without recurrent hernia or discrete collection.\par \par \par

## 2023-03-31 NOTE — HISTORY OF PRESENT ILLNESS
[___ mths] : [unfilled] month(s) ago [Constant] : constant [5] : a minimum pain level of 5/10 [6] : a maximum pain level of 6/10 [Sharp] : sharp [Aching] : aching [Laying] : laying [Sitting] : sitting [Walking] : walking [Rest] : rest [FreeTextEntry1] : Interval History:\par Today her greatest complaint is her back pain. Pain radiates down the left leg. Quality of life is impaired. There has been a severe exacerbation of the patient's chronic pain. She is here to review her MRI. No relief w/ medications. Abdominal pain has improved.\par \par HPI: Ms. CB ZHEGN is a 42 year F with inflammatory arthritis, bilateral inguinal hernia repair (right April '22, left July '22). Reports persistent pain that runs from groin anteriorly to knee since surgery. Quality of life is impaired. There has been a severe exacerbation of the patient's chronic pain.\par \par \par   [FreeTextEntry2] : 18 [FreeTextEntry7] : Groin pain

## 2025-05-12 ENCOUNTER — APPOINTMENT (OUTPATIENT)
Dept: PAIN MANAGEMENT | Facility: CLINIC | Age: 45
End: 2025-05-12

## 2025-06-16 ENCOUNTER — APPOINTMENT (OUTPATIENT)
Dept: PAIN MANAGEMENT | Facility: CLINIC | Age: 45
End: 2025-06-16

## 2025-07-24 ENCOUNTER — APPOINTMENT (OUTPATIENT)
Dept: NEUROLOGY | Facility: CLINIC | Age: 45
End: 2025-07-24

## 2025-08-15 ENCOUNTER — APPOINTMENT (OUTPATIENT)
Dept: OBGYN | Facility: CLINIC | Age: 45
End: 2025-08-15
Payer: COMMERCIAL

## 2025-08-15 VITALS
DIASTOLIC BLOOD PRESSURE: 68 MMHG | HEIGHT: 60 IN | BODY MASS INDEX: 36.52 KG/M2 | SYSTOLIC BLOOD PRESSURE: 100 MMHG | WEIGHT: 186 LBS

## 2025-08-15 DIAGNOSIS — R92.30 DENSE BREASTS, UNSPECIFIED: ICD-10-CM

## 2025-08-15 DIAGNOSIS — R32 UNSPECIFIED URINARY INCONTINENCE: ICD-10-CM

## 2025-08-15 DIAGNOSIS — Z12.31 ENCOUNTER FOR SCREENING MAMMOGRAM FOR MALIGNANT NEOPLASM OF BREAST: ICD-10-CM

## 2025-08-15 DIAGNOSIS — Z01.419 ENCOUNTER FOR GYNECOLOGICAL EXAMINATION (GENERAL) (ROUTINE) W/OUT ABNORMAL FINDINGS: ICD-10-CM

## 2025-08-15 DIAGNOSIS — N93.9 ABNORMAL UTERINE AND VAGINAL BLEEDING, UNSPECIFIED: ICD-10-CM

## 2025-08-15 PROCEDURE — 99459 PELVIC EXAMINATION: CPT

## 2025-08-15 PROCEDURE — 99386 PREV VISIT NEW AGE 40-64: CPT

## 2025-08-17 LAB — HPV HIGH+LOW RISK DNA PNL CVX: NOT DETECTED

## 2025-08-19 LAB
A VAGINAE DNA VAG QL NAA+PROBE: ABNORMAL
BVAB2 DNA VAG QL NAA+PROBE: ABNORMAL
C KRUSEI DNA VAG QL NAA+PROBE: NEGATIVE
C TRACH RRNA SPEC QL NAA+PROBE: DETECTED
C TRACH RRNA SPEC QL NAA+PROBE: POSITIVE
CANDIDA DNA VAG QL NAA+PROBE: NEGATIVE
MEGA1 DNA VAG QL NAA+PROBE: ABNORMAL
N GONORRHOEA RRNA SPEC QL NAA+PROBE: NEGATIVE
N GONORRHOEA RRNA SPEC QL NAA+PROBE: NOT DETECTED
SOURCE TP AMPLIFICATION: NORMAL
T VAGINALIS RRNA SPEC QL NAA+PROBE: NEGATIVE
T VAGINALIS RRNA SPEC QL NAA+PROBE: NOT DETECTED

## 2025-08-22 ENCOUNTER — APPOINTMENT (OUTPATIENT)
Dept: OBGYN | Facility: CLINIC | Age: 45
End: 2025-08-22
Payer: COMMERCIAL

## 2025-08-22 ENCOUNTER — NON-APPOINTMENT (OUTPATIENT)
Age: 45
End: 2025-08-22

## 2025-08-22 ENCOUNTER — ASOB RESULT (OUTPATIENT)
Age: 45
End: 2025-08-22

## 2025-08-22 DIAGNOSIS — A56.8 SEXUALLY TRANSMITTED CHLAMYDIAL INFECTION OF OTHER SITES: ICD-10-CM

## 2025-08-22 LAB — CYTOLOGY CVX/VAG DOC THIN PREP: NORMAL

## 2025-08-22 PROCEDURE — 76830 TRANSVAGINAL US NON-OB: CPT

## 2025-08-22 RX ORDER — DOXYCYCLINE 100 MG/1
100 TABLET, FILM COATED ORAL TWICE DAILY
Qty: 14 | Refills: 0 | Status: ACTIVE | COMMUNITY
Start: 2025-08-22 | End: 1900-01-01